# Patient Record
Sex: FEMALE | Race: WHITE | NOT HISPANIC OR LATINO | Employment: FULL TIME | URBAN - METROPOLITAN AREA
[De-identification: names, ages, dates, MRNs, and addresses within clinical notes are randomized per-mention and may not be internally consistent; named-entity substitution may affect disease eponyms.]

---

## 2023-07-14 ENCOUNTER — OFFICE VISIT (OUTPATIENT)
Dept: PRIMARY CARE | Facility: CLINIC | Age: 38
End: 2023-07-14
Payer: COMMERCIAL

## 2023-07-14 VITALS
HEART RATE: 75 BPM | WEIGHT: 200 LBS | DIASTOLIC BLOOD PRESSURE: 83 MMHG | BODY MASS INDEX: 32.28 KG/M2 | RESPIRATION RATE: 16 BRPM | SYSTOLIC BLOOD PRESSURE: 123 MMHG | TEMPERATURE: 98.1 F

## 2023-07-14 DIAGNOSIS — Z13.220 LIPID SCREENING: ICD-10-CM

## 2023-07-14 DIAGNOSIS — E55.9 VITAMIN D DEFICIENCY: ICD-10-CM

## 2023-07-14 DIAGNOSIS — R53.83 OTHER FATIGUE: ICD-10-CM

## 2023-07-14 DIAGNOSIS — R79.89 ELEVATED TSH: ICD-10-CM

## 2023-07-14 DIAGNOSIS — Z00.00 PHYSICAL EXAM, ANNUAL: Primary | ICD-10-CM

## 2023-07-14 PROBLEM — H93.233 HYPERACUSIS OF BOTH EARS: Status: ACTIVE | Noted: 2023-07-14

## 2023-07-14 PROCEDURE — 99385 PREV VISIT NEW AGE 18-39: CPT | Performed by: NURSE PRACTITIONER

## 2023-07-14 PROCEDURE — 1036F TOBACCO NON-USER: CPT | Performed by: NURSE PRACTITIONER

## 2023-07-14 RX ORDER — MAGNESIUM 200 MG
TABLET ORAL
COMMUNITY
Start: 2022-07-22

## 2023-07-14 RX ORDER — RIBOFLAVIN (VITAMIN B2) 400 MG
TABLET ORAL
COMMUNITY
Start: 2022-07-22

## 2023-07-14 RX ORDER — ACETAMINOPHEN 500 MG
TABLET ORAL
COMMUNITY
Start: 2018-09-19

## 2023-07-14 NOTE — PROGRESS NOTES
Subjective   Patient ID: Heather Khanna is a 38 y.o. female who presents for establishing care, no problems or concerns.    Pt here to establish care     UTD with [facility]    Due for labs        Review of Systems   Constitutional:  Negative for chills, fatigue and fever.   HENT:  Negative for congestion, ear pain, rhinorrhea, sinus pressure and sore throat.    Eyes:  Negative for pain, discharge and itching.   Respiratory:  Negative for cough, shortness of breath and wheezing.    Cardiovascular:  Negative for chest pain and palpitations.   Gastrointestinal:  Negative for constipation, diarrhea, nausea and vomiting.   Genitourinary:  Negative for difficulty urinating and dysuria.   Musculoskeletal:  Negative for back pain, joint swelling and myalgias.   Skin:  Negative for color change.   Neurological:  Negative for headaches.   Hematological:  Negative for adenopathy.   Psychiatric/Behavioral:  Negative for decreased concentration. The patient is not nervous/anxious.        Objective     /83   Pulse 75   Temp 36.7 °C (98.1 °F)   Resp 16   Wt 90.7 kg (200 lb)   BMI 32.28 kg/m²      Physical Exam  Constitutional:       General: She is not in acute distress.     Appearance: She is not ill-appearing.   HENT:      Head: Normocephalic and atraumatic.      Mouth/Throat:      Mouth: Mucous membranes are moist.      Pharynx: Oropharynx is clear.   Eyes:      Conjunctiva/sclera: Conjunctivae normal.      Pupils: Pupils are equal, round, and reactive to light.   Cardiovascular:      Rate and Rhythm: Normal rate and regular rhythm.      Pulses: Normal pulses.      Heart sounds: Normal heart sounds.   Pulmonary:      Effort: Pulmonary effort is normal. No respiratory distress.      Breath sounds: Normal breath sounds.   Abdominal:      General: Bowel sounds are normal.      Palpations: Abdomen is soft.      Tenderness: There is no abdominal tenderness.   Musculoskeletal:         General: Normal range of  motion.   Skin:     General: Skin is warm and dry.   Neurological:      General: No focal deficit present.      Mental Status: She is alert and oriented to person, place, and time.   Psychiatric:         Mood and Affect: Mood normal.         Behavior: Behavior normal.         Thought Content: Thought content normal.         Judgment: Judgment normal.         Assessment/Plan   Problem List Items Addressed This Visit       Elevated TSH    Relevant Orders    TSH with reflex to Free T4 if abnormal     Other Visit Diagnoses       Physical exam, annual    -  Primary    Other fatigue        Relevant Orders    CBC and Auto Differential    Comprehensive Metabolic Panel    Vitamin D deficiency        Relevant Orders    Vitamin D, Total    Lipid screening        Relevant Orders    Lipid Panel            Patient Instructions   Patient to continue medications as ordered. Have fasting labs drawn, and we will call with results when available. Follow-up in 1 year, or sooner if needed.

## 2023-07-26 ASSESSMENT — ENCOUNTER SYMPTOMS
SINUS PRESSURE: 0
DIFFICULTY URINATING: 0
EYE ITCHING: 0
NERVOUS/ANXIOUS: 0
COUGH: 0
CONSTIPATION: 0
RHINORRHEA: 0
EYE PAIN: 0
HEADACHES: 0
SHORTNESS OF BREATH: 0
BACK PAIN: 0
COLOR CHANGE: 0
MYALGIAS: 0
VOMITING: 0
SORE THROAT: 0
EYE DISCHARGE: 0
CHILLS: 0
WHEEZING: 0
DIARRHEA: 0
NAUSEA: 0
ADENOPATHY: 0
JOINT SWELLING: 0
FEVER: 0
FATIGUE: 0
PALPITATIONS: 0
DYSURIA: 0
DECREASED CONCENTRATION: 0

## 2023-08-03 ENCOUNTER — APPOINTMENT (OUTPATIENT)
Dept: PRIMARY CARE | Facility: CLINIC | Age: 38
End: 2023-08-03
Payer: COMMERCIAL

## 2023-08-04 DIAGNOSIS — D22.9 CHANGE IN MOLE: Primary | ICD-10-CM

## 2023-08-28 ENCOUNTER — OFFICE VISIT (OUTPATIENT)
Dept: PRIMARY CARE | Facility: CLINIC | Age: 38
End: 2023-08-28
Payer: COMMERCIAL

## 2023-08-28 VITALS
OXYGEN SATURATION: 97 % | SYSTOLIC BLOOD PRESSURE: 129 MMHG | RESPIRATION RATE: 16 BRPM | HEIGHT: 66 IN | HEART RATE: 97 BPM | DIASTOLIC BLOOD PRESSURE: 79 MMHG | WEIGHT: 204 LBS | TEMPERATURE: 97.8 F | BODY MASS INDEX: 32.78 KG/M2

## 2023-08-28 DIAGNOSIS — L29.8 PRURITIC ERYTHEMATOUS RASH: ICD-10-CM

## 2023-08-28 DIAGNOSIS — Z91.030 BEE STING ALLERGY: Primary | ICD-10-CM

## 2023-08-28 DIAGNOSIS — E66.9 CLASS 1 OBESITY WITH BODY MASS INDEX (BMI) OF 32.0 TO 32.9 IN ADULT, UNSPECIFIED OBESITY TYPE, UNSPECIFIED WHETHER SERIOUS COMORBIDITY PRESENT: ICD-10-CM

## 2023-08-28 DIAGNOSIS — L29.9 ITCHY SKIN: ICD-10-CM

## 2023-08-28 PROCEDURE — 99214 OFFICE O/P EST MOD 30 MIN: CPT | Performed by: NURSE PRACTITIONER

## 2023-08-28 PROCEDURE — 96372 THER/PROPH/DIAG INJ SC/IM: CPT | Performed by: NURSE PRACTITIONER

## 2023-08-28 RX ORDER — TRIAMCINOLONE ACETONIDE 40 MG/ML
80 INJECTION, SUSPENSION INTRA-ARTICULAR; INTRAMUSCULAR ONCE
Status: COMPLETED | OUTPATIENT
Start: 2023-08-28 | End: 2023-08-28

## 2023-08-28 RX ORDER — PREDNISONE 10 MG/1
TABLET ORAL
Qty: 30 TABLET | Refills: 0 | Status: SHIPPED | OUTPATIENT
Start: 2023-08-28 | End: 2023-09-07

## 2023-08-28 RX ORDER — CETIRIZINE HYDROCHLORIDE 10 MG/1
10 TABLET ORAL DAILY
Qty: 30 TABLET | Refills: 2 | Status: SHIPPED | OUTPATIENT
Start: 2023-08-28 | End: 2023-11-26

## 2023-08-28 RX ADMIN — TRIAMCINOLONE ACETONIDE 80 MG: 40 INJECTION, SUSPENSION INTRA-ARTICULAR; INTRAMUSCULAR at 15:01

## 2023-08-28 ASSESSMENT — PATIENT HEALTH QUESTIONNAIRE - PHQ9
SUM OF ALL RESPONSES TO PHQ9 QUESTIONS 1 AND 2: 0
1. LITTLE INTEREST OR PLEASURE IN DOING THINGS: NOT AT ALL
2. FEELING DOWN, DEPRESSED OR HOPELESS: NOT AT ALL
SUM OF ALL RESPONSES TO PHQ9 QUESTIONS 1 AND 2: 0
1. LITTLE INTEREST OR PLEASURE IN DOING THINGS: NOT AT ALL
2. FEELING DOWN, DEPRESSED OR HOPELESS: NOT AT ALL

## 2023-08-28 ASSESSMENT — ENCOUNTER SYMPTOMS
LOSS OF SENSATION IN FEET: 0
DEPRESSION: 0
OCCASIONAL FEELINGS OF UNSTEADINESS: 0

## 2023-08-28 NOTE — PROGRESS NOTES
"Subjective   Patient ID: Heather Khanna is a 38 y.o. female who presents for Insect Bite.    Pt is in office for a bee sting on left ankle pt did take OTC benadryl , pt claims it didn't help because it keeps swelling.          Review of Systems    Objective   /84   Pulse 97   Temp 36.6 °C (97.8 °F) (Temporal)   Resp 16   Ht 1.676 m (5' 6\")   Wt 92.5 kg (204 lb)   SpO2 97%   BMI 32.93 kg/m²     Physical Exam    Assessment/Plan          "

## 2023-08-28 NOTE — PROGRESS NOTES
Subjective   Patient ID: Heather Khanna is a 38 y.o. female who is with complaint of itching, pain, tenderness, redness and swelling of the skin on the left ankle after being stung by a bee.    HPI  Patient is a 38 y.o. female who CONSULTED AT [facility] today. Patient is with complaint of itching, pain, tenderness, redness and swelling of the skin on the left ankle after being stung by a bee. Patient states symptoms has started yesterday. Patient has taken benadryl last night for relief of symptoms. She states that the rash are red, itchy, painful, with no discharge, and no bleeding. she denies any wheezing, shortness of breath, change in voice, nor chest pain at present. she denies fever, chills, cough, nor runny nose. she denies any other signs or symptoms.    Review of Systems  General: no weight loss, generally healthy, no fatigue  Head:  no headaches / sinus pain, no vertigo, no injury  Eyes: no diplopia, no tearing, no pain,   Ears: no change in hearing, no tinnitus, no bleeding, no vertigo  Mouth:  no dental difficulties, no gingival bleeding, no sore throat, no loss of sense of taste  Nose: no congestion, no  discharge, no bleeding, no obstruction, no loss of sense of smell  Neck: no stiffness, no pain, no tenderness, no masses, no bruit  Pulmonary: no dyspnea, no wheezing, no hemoptysis, no cough  Cardiovascular: no chest pain, no palpitations, no syncope, no orthopnea  Gastrointestinal: no change in appetite, no dysphagia, no abdominal pains, no diarrhea, no emesis, no melena  Genito Urinary: no dysuria, no urinary urgency, no nocturia, no incontinence, no change in nature of urine  Musculoskeletal: no muscle ache, no joint pain, no limitation of range of motion, no paresthesia, no numbness  Skin: (+) itching, pain, tenderness, redness and swelling of the skin on the left ankle  Constitutional: no fever, no chills, no night sweats    Objective   Physical Exam  General: ambulatory,  in no acute distress  Head: normocephalic, no lesions  Eyes: pink palpebral conjunctiva, anicteric sclerae, PERRLA, EOM's full  Nose: nasal mucosa normal, no nasal discharge, no bleeding, no obstruction  Throat: clear, no exudate, no lesions  Neck: supple, no masses, no bruits  Chest: symmetrical chest expansion, no lagging, no retractions, clear breath sounds, no rales, no wheezes  Extremities: full and equal peripheral pulses, no edema,  SKIN: (+) maculopapular rash on the medial and lateral aspects of left ankle: rashes are erythematous, slightly elevated, measures 5 - 6 cm diameters, pruritic, slightly tender, no discharge, no bleeding, with no lymphatic streaking of skin.    Assessment/Plan   Problem List Items Addressed This Visit    None  Visit Diagnoses       Bee sting allergy    -  Primary    Relevant Medications    triamcinolone acetonide (Kenalog-40) injection 80 mg (Completed)    predniSONE (Deltasone) 10 mg tablet    cetirizine (ZyrTEC) 10 mg tablet    Itchy skin        Relevant Medications    triamcinolone acetonide (Kenalog-40) injection 80 mg (Completed)    predniSONE (Deltasone) 10 mg tablet    cetirizine (ZyrTEC) 10 mg tablet    Pruritic erythematous rash        Relevant Medications    triamcinolone acetonide (Kenalog-40) injection 80 mg (Completed)    predniSONE (Deltasone) 10 mg tablet    cetirizine (ZyrTEC) 10 mg tablet    BMI 32.0-32.9,adult        Class 1 obesity with body mass index (BMI) of 32.0 to 32.9 in adult, unspecified obesity type, unspecified whether serious comorbidity present            Patient was given Triamcinolone injection (per IM) today, administered by MA.  Patient tolerated procedure well.    DISCHARGE SUMMARY:   Patient was seen and examined. Diagnosis, treatment, treatment options, and possible complications of today's illness discussed and explained to patient. Patient to take medication/s associated with this visit. Advised avoidance of known or suspected allergen.  Advised hypoallergenic diet. Advised to come back if with worsening or persistent symptoms. Advised to come back if there is wheezing, change in voice quality, shortness of breath or chest pain. Patient verbalized understanding of plan of care.    Patient to come back in 7 - 10 days if needed for worsening symptoms.

## 2023-08-29 ENCOUNTER — TELEPHONE (OUTPATIENT)
Dept: PRIMARY CARE | Facility: CLINIC | Age: 38
End: 2023-08-29

## 2023-08-29 ENCOUNTER — OFFICE VISIT (OUTPATIENT)
Dept: PRIMARY CARE | Facility: CLINIC | Age: 38
End: 2023-08-29
Payer: COMMERCIAL

## 2023-08-29 VITALS
OXYGEN SATURATION: 100 % | HEIGHT: 66 IN | WEIGHT: 204 LBS | TEMPERATURE: 98.4 F | BODY MASS INDEX: 32.78 KG/M2 | DIASTOLIC BLOOD PRESSURE: 72 MMHG | HEART RATE: 97 BPM | SYSTOLIC BLOOD PRESSURE: 124 MMHG

## 2023-08-29 DIAGNOSIS — M79.89 LEFT LEG SWELLING: ICD-10-CM

## 2023-08-29 DIAGNOSIS — Z91.030 BEE STING ALLERGY: Primary | ICD-10-CM

## 2023-08-29 PROCEDURE — 1036F TOBACCO NON-USER: CPT | Performed by: NURSE PRACTITIONER

## 2023-08-29 PROCEDURE — 3008F BODY MASS INDEX DOCD: CPT | Performed by: NURSE PRACTITIONER

## 2023-08-29 PROCEDURE — 99214 OFFICE O/P EST MOD 30 MIN: CPT | Performed by: NURSE PRACTITIONER

## 2023-08-29 ASSESSMENT — ENCOUNTER SYMPTOMS
CONSTITUTIONAL NEGATIVE: 1
RESPIRATORY NEGATIVE: 1
CARDIOVASCULAR NEGATIVE: 1

## 2023-08-29 NOTE — TELEPHONE ENCOUNTER
Spoke to patient and relayed result of negative Venous duplex US. Awaiting final report still, but patient is aware. She says that the swelling has improved slightly with elevation and wrapping. She will continue to ice it and with the plan of care and follow up as needed. No further questions per pt.

## 2023-08-29 NOTE — PROGRESS NOTES
"Subjective   Patient ID: Heather Khanna is a 38 y.o. female who presents for No chief complaint on file..    Patient was stung by a bee on the left ankle on Saturday. Yesterday, patient was seen at the [facility] with itching, pain, tenderness, redness and swelling of the skin of the on the left ankle. Patient saw TARYN Everett and was given 80mg injection of triamcinolone and a prednisone taper that she started today. Patient says that her ankle is still just as swollen as yesterday. Patient says that it hurts to bear weight on the ankle. She also says that she has some numbness in the left calf.     Review of Systems   Constitutional: Negative.    HENT: Negative.     Respiratory: Negative.     Cardiovascular: Negative.    Skin:         Redness, swelling of the left ankle.      Visit Vitals  /72   Pulse 97   Temp 36.9 °C (98.4 °F) (Temporal)   Ht 1.676 m (5' 6\")   Wt 92.5 kg (204 lb)   SpO2 100%   BMI 32.93 kg/m²   Smoking Status Never   BSA 2.08 m²      Physical Exam  Vitals reviewed.   Constitutional:       Appearance: Normal appearance.   HENT:      Head: Atraumatic.      Nose: Nose normal.   Cardiovascular:      Rate and Rhythm: Normal rate and regular rhythm.      Heart sounds: Normal heart sounds. No murmur heard.  Pulmonary:      Effort: Pulmonary effort is normal.      Breath sounds: Normal breath sounds. No wheezing or rhonchi.   Musculoskeletal:         General: Normal range of motion.   Skin:     General: Skin is dry.      Findings: Erythema present.      Comments: Redness, swelling of the left ankle. There is tenderness to touch. There is an insect sting with no stinger visible. There is no streaking. There is no purulent drainage. Patient had ankle wrapped by medical assistant prior to leaving office.       Neurological:      General: No focal deficit present.      Mental Status: She is alert.   Psychiatric:         Mood and Affect: Mood normal.       Assessment/Plan   Problem List " Items Addressed This Visit    None  Visit Diagnoses       Bee sting allergy    -  Primary    Relevant Orders    Lower extremity venous duplex left    Referral to Allergy    Left leg swelling        Relevant Orders    Lower extremity venous duplex left        Patient received 80 mg of Kenalog on 8/28. She started her prednisone today. Patient is on the correct course of medication at this time. Will check calf to ensure there is no blood clot due to the numbness and swelling. Advised pt that she must wrap her ankle and elevate her leg as well. Patient does not want to start any oral antibiotics. Patient to take zyrtec to help with the itching. Advised ER for any worsening pain, difficulty ambulating or new/concerning symptoms; she agreed. Pt advised to follow up by Thursday if still not improving.     Will refer to allergist as well to determine if she has a true allergy to bee stings.

## 2023-09-01 ENCOUNTER — TELEPHONE (OUTPATIENT)
Dept: PRIMARY CARE | Facility: CLINIC | Age: 38
End: 2023-09-01
Payer: COMMERCIAL

## 2023-09-01 NOTE — RESULT ENCOUNTER NOTE
Please let pt know that final report for negative venous duplex came back as negative. I saw she followed up with dermatology so if she needs anything else please have her come back.

## 2023-10-04 ENCOUNTER — TELEPHONE (OUTPATIENT)
Dept: OBSTETRICS AND GYNECOLOGY | Facility: CLINIC | Age: 38
End: 2023-10-04
Payer: COMMERCIAL

## 2023-10-04 NOTE — TELEPHONE ENCOUNTER
Pt recently got stung by a bee and has been taking prednizone(?) and another steroid shot. She's wondering if it could be affecting her period at all. She's experiencing an abnormal period that has lasted 9 days and has come a week early. Her periods are normally regular. Please advise. If patient needs an appointment, can you provide a date that would be reasonable for the situation?

## 2023-10-06 NOTE — TELEPHONE ENCOUNTER
Spoke to patient   Informed of allie message   Normal while on a steroid and to monitor for 3 months.   Patient had no additional questions/concerns.

## 2023-10-12 PROBLEM — Z31.81 FEMALE INFERTILITY ASSOCIATED WITH MALE FACTORS: Status: ACTIVE | Noted: 2023-10-12

## 2023-10-12 PROBLEM — L98.0 PYOGENIC GRANULOMA: Status: ACTIVE | Noted: 2023-10-12

## 2023-10-12 PROBLEM — H93.211: Status: ACTIVE | Noted: 2023-10-12

## 2023-10-12 PROBLEM — N89.8 VAGINAL ODOR: Status: ACTIVE | Noted: 2023-10-12

## 2023-10-12 PROBLEM — N97.8 FEMALE INFERTILITY ASSOCIATED WITH MALE FACTORS: Status: ACTIVE | Noted: 2023-10-12

## 2023-10-12 PROBLEM — N89.8 VAGINAL ITCHING: Status: ACTIVE | Noted: 2023-10-12

## 2023-10-12 PROBLEM — N89.8 VAGINAL DISCHARGE: Status: ACTIVE | Noted: 2023-10-12

## 2023-10-12 PROBLEM — H93.299 ABNORMAL AUDITORY PERCEPTION: Status: ACTIVE | Noted: 2023-10-12

## 2023-10-12 RX ORDER — BUTYROSPERMUM PARKII(SHEA BUTTER), SIMMONDSIA CHINENSIS (JOJOBA) SEED OIL, ALOE BARBADENSIS LEAF EXTRACT .01; 1; 3.5 G/100G; G/100G; G/100G
LIQUID TOPICAL
COMMUNITY

## 2023-10-12 RX ORDER — ERGOCALCIFEROL (VITAMIN D2) 10 MCG
1 TABLET ORAL DAILY
COMMUNITY

## 2023-10-12 RX ORDER — EPINEPHRINE 0.3 MG/.3ML
0.3 INJECTION SUBCUTANEOUS AS NEEDED
COMMUNITY

## 2023-10-13 ENCOUNTER — OFFICE VISIT (OUTPATIENT)
Dept: PRIMARY CARE | Facility: CLINIC | Age: 38
End: 2023-10-13
Payer: COMMERCIAL

## 2023-10-13 VITALS
HEART RATE: 73 BPM | BODY MASS INDEX: 32.67 KG/M2 | DIASTOLIC BLOOD PRESSURE: 83 MMHG | WEIGHT: 202.4 LBS | RESPIRATION RATE: 16 BRPM | SYSTOLIC BLOOD PRESSURE: 129 MMHG | TEMPERATURE: 97.7 F

## 2023-10-13 DIAGNOSIS — T63.441D LOCAL REACTION TO BEE STING, ACCIDENTAL OR UNINTENTIONAL, SUBSEQUENT ENCOUNTER: Primary | ICD-10-CM

## 2023-10-13 DIAGNOSIS — Z91.030 BEE STING ALLERGY: ICD-10-CM

## 2023-10-13 PROBLEM — T63.441A LOCAL REACTION TO BEE STING: Status: ACTIVE | Noted: 2023-09-25

## 2023-10-13 PROCEDURE — 3008F BODY MASS INDEX DOCD: CPT | Performed by: NURSE PRACTITIONER

## 2023-10-13 PROCEDURE — 1036F TOBACCO NON-USER: CPT | Performed by: NURSE PRACTITIONER

## 2023-10-13 PROCEDURE — 99213 OFFICE O/P EST LOW 20 MIN: CPT | Performed by: NURSE PRACTITIONER

## 2023-10-13 ASSESSMENT — ENCOUNTER SYMPTOMS
SHORTNESS OF BREATH: 0
EYE ITCHING: 0
ALLERGIC REACTION: 1
NERVOUS/ANXIOUS: 0
PALPITATIONS: 0
FEVER: 0
JOINT SWELLING: 0
EYE DISCHARGE: 0
DIARRHEA: 0
CHILLS: 0
SINUS PRESSURE: 0
VOMITING: 0
SORE THROAT: 0
COUGH: 0
MYALGIAS: 0
DYSURIA: 0
CONSTIPATION: 0
EYE PAIN: 0
DECREASED CONCENTRATION: 0
HEADACHES: 0
DIFFICULTY URINATING: 0
RHINORRHEA: 0
BACK PAIN: 0
WHEEZING: 0
COLOR CHANGE: 0
FATIGUE: 0
NAUSEA: 0
ADENOPATHY: 0

## 2023-10-13 NOTE — PROGRESS NOTES
Subjective   Patient ID: Heather Khanna is a 38 y.o. female who presents for Allergic Reaction (Pt here to discuss bee sting in August and epi-pen. Since bee sting and steroid treatment, pt has had irregular periods and chin hair growth. ).    Allergic Reaction  This is a new problem. The current episode started more than 1 week ago. The problem occurs rarely. The problem is unchanged. The patient was exposed to insect bite. Pertinent negatives include no chest pain, coughing, diarrhea, eye itching, vomiting or wheezing.       Review of Systems   Constitutional:  Negative for chills, fatigue and fever.   HENT:  Negative for congestion, ear pain, rhinorrhea, sinus pressure and sore throat.    Eyes:  Negative for pain, discharge and itching.   Respiratory:  Negative for cough, shortness of breath and wheezing.    Cardiovascular:  Negative for chest pain and palpitations.   Gastrointestinal:  Negative for constipation, diarrhea, nausea and vomiting.   Genitourinary:  Positive for vaginal bleeding. Negative for difficulty urinating and dysuria.   Musculoskeletal:  Negative for back pain, joint swelling and myalgias.   Skin:  Negative for color change.   Neurological:  Negative for headaches.   Hematological:  Negative for adenopathy.   Psychiatric/Behavioral:  Negative for decreased concentration. The patient is not nervous/anxious.        Objective     /83   Pulse 73   Temp 36.5 °C (97.7 °F)   Resp 16   Wt 91.8 kg (202 lb 6.4 oz)   BMI 32.67 kg/m²      Physical Exam  Constitutional:       General: She is not in acute distress.     Appearance: She is not ill-appearing.   HENT:      Head: Normocephalic and atraumatic.      Mouth/Throat:      Mouth: Mucous membranes are moist.      Pharynx: Oropharynx is clear.   Eyes:      Conjunctiva/sclera: Conjunctivae normal.      Pupils: Pupils are equal, round, and reactive to light.   Cardiovascular:      Rate and Rhythm: Normal rate and regular rhythm.      Pulses:  Normal pulses.      Heart sounds: Normal heart sounds.   Pulmonary:      Effort: Pulmonary effort is normal. No respiratory distress.      Breath sounds: Normal breath sounds.   Abdominal:      General: Bowel sounds are normal.      Palpations: Abdomen is soft.      Tenderness: There is no abdominal tenderness.   Musculoskeletal:         General: Normal range of motion.   Skin:     General: Skin is warm and dry.   Neurological:      General: No focal deficit present.      Mental Status: She is alert and oriented to person, place, and time.   Psychiatric:         Mood and Affect: Mood normal.         Behavior: Behavior normal.         Thought Content: Thought content normal.         Judgment: Judgment normal.         Assessment/Plan   Problem List Items Addressed This Visit       Local reaction to bee sting - Primary     Other Visit Diagnoses       Bee sting allergy                Patient Instructions   Patient to continue medications as ordered. Follow-up for physical in 6 months, or sooner if needed. Call the office if any problems or concerns in the meantime.

## 2023-10-30 NOTE — PATIENT INSTRUCTIONS
Patient to continue medications as ordered. Follow-up for physical in 6 months, or sooner if needed. Call the office if any problems or concerns in the meantime.

## 2023-11-29 ENCOUNTER — OFFICE VISIT (OUTPATIENT)
Dept: PRIMARY CARE | Facility: CLINIC | Age: 38
End: 2023-11-29
Payer: COMMERCIAL

## 2023-11-29 VITALS
SYSTOLIC BLOOD PRESSURE: 118 MMHG | WEIGHT: 211 LBS | DIASTOLIC BLOOD PRESSURE: 68 MMHG | BODY MASS INDEX: 34.06 KG/M2 | TEMPERATURE: 97.7 F | RESPIRATION RATE: 16 BRPM | HEART RATE: 82 BPM

## 2023-11-29 DIAGNOSIS — H65.03 BILATERAL ACUTE SEROUS OTITIS MEDIA, RECURRENCE NOT SPECIFIED: Primary | ICD-10-CM

## 2023-11-29 PROCEDURE — 99213 OFFICE O/P EST LOW 20 MIN: CPT | Performed by: NURSE PRACTITIONER

## 2023-11-29 PROCEDURE — 1036F TOBACCO NON-USER: CPT | Performed by: NURSE PRACTITIONER

## 2023-11-29 PROCEDURE — 3008F BODY MASS INDEX DOCD: CPT | Performed by: NURSE PRACTITIONER

## 2023-11-29 RX ORDER — FLUTICASONE PROPIONATE 50 MCG
1 SPRAY, SUSPENSION (ML) NASAL DAILY
Qty: 16 G | Refills: 0 | Status: SHIPPED | OUTPATIENT
Start: 2023-11-29 | End: 2023-12-13

## 2023-11-29 ASSESSMENT — ENCOUNTER SYMPTOMS: COUGH: 1

## 2023-11-29 NOTE — PROGRESS NOTES
Subjective   Patient ID: Heather Khanna is a 38 y.o. female who presents for Otitis Media (Pt here to discuss left side ear pain, pt heard crackling, congestion ).    Recent cold symptoms some ringing in the ear. Nephew had ear infection on antibiotics for it.   Hear popping this morning and pain, no fever.   This morning happened acutely. Left side only.   Sore throat as well, coughing mildly with irritation and phelgm.,    Ear Fullness   There is pain in the left ear. This is a new problem. The current episode started in the past 7 days. The problem occurs constantly. The problem has been unchanged. There has been no fever. Associated symptoms include coughing and hearing loss. She has tried nothing for the symptoms. The treatment provided no relief.        Review of Systems   HENT:  Positive for hearing loss.    Respiratory:  Positive for cough.        Objective   /68   Pulse 82   Temp 36.5 °C (97.7 °F)   Resp 16   Wt 95.7 kg (211 lb)   BMI 34.06 kg/m²     Physical Exam  Vitals reviewed.   HENT:      Head: Normocephalic.      Right Ear: A middle ear effusion (serous) is present. Tympanic membrane is not erythematous, retracted or bulging.      Left Ear: A middle ear effusion (serous) is present. Tympanic membrane is not erythematous, retracted or bulging.   Cardiovascular:      Rate and Rhythm: Normal rate and regular rhythm.      Pulses: Normal pulses.      Heart sounds: Normal heart sounds. No murmur heard.     No friction rub. No gallop.   Pulmonary:      Effort: Pulmonary effort is normal. No respiratory distress.      Breath sounds: Normal breath sounds. No stridor. No wheezing, rhonchi or rales.   Chest:      Chest wall: No tenderness.   Neurological:      General: No focal deficit present.      Mental Status: She is alert and oriented to person, place, and time. Mental status is at baseline.         Assessment/Plan   Problem List Items Addressed This Visit    None  Visit Diagnoses         Codes     Bilateral acute serous otitis media, recurrence not specified    -  Primary H65.03    Relevant Medications    fluticasone (Flonase) 50 mcg/actuation nasal spray           Motrin as needed for ear pain flonase for effusion nasal drainage and congestion.